# Patient Record
Sex: FEMALE
[De-identification: names, ages, dates, MRNs, and addresses within clinical notes are randomized per-mention and may not be internally consistent; named-entity substitution may affect disease eponyms.]

---

## 2023-01-12 ENCOUNTER — TRANSCRIBE ORDERS (OUTPATIENT)
Dept: OTHER | Age: 78
End: 2023-01-12

## 2023-01-12 ENCOUNTER — MEDICAL CORRESPONDENCE (OUTPATIENT)
Dept: HEALTH INFORMATION MANAGEMENT | Facility: CLINIC | Age: 78
End: 2023-01-12

## 2023-01-12 DIAGNOSIS — C79.51 BONE METASTASES: Primary | ICD-10-CM

## 2023-03-29 ENCOUNTER — TELEPHONE (OUTPATIENT)
Dept: ORTHOPEDICS | Facility: CLINIC | Age: 78
End: 2023-03-29

## 2023-03-29 NOTE — TELEPHONE ENCOUNTER
Called and left voice message asking for a return call to set up an appointment with an orthopedic oncologist. Gave them my direct number to call [phone].    Maria Ines Dunn LPN

## 2023-03-29 NOTE — LETTER
3/29/2023         RE: Lisa MIRANDA Buster  [address] [address] [address]        Dear Ms. Goins-    I left you a voice message today answering a call you made about a referral you were given to see one of our orthopedic oncologists. I wanted to send you a letter as well to be sure you received the information.     Unfortunately our providers are not in the [employer] network. If you still have [employer] for insurance, you will need to call the number on your [employer] card to ask one of their representatives which provider you should follow up with for care.     If you have another insurance provider, please call be back at the number listed in my signature.     Sincerely,    Maria Ines Dunn LPN  She/Her/ Hers  [facility]  [facility]  [address] [address] [phone]

## 2023-03-29 NOTE — TELEPHONE ENCOUNTER
[facility] Call Center    Phone Message    May a detailed message be left on voicemail: yes     Reason for Call Trying to be seen by Doctor for Right Shoulder Please call Mandi . Stated Images in Epic   Action Taken: Message routed to:  [facility] ([facility]): [email or web address]    Travel Screening: Not Applicable

## 2023-03-30 NOTE — TELEPHONE ENCOUNTER
Called and left a  for patient stating that we were no longer in the [employer] network, of patient still had [employer] for insurance she should call the number on the back of her insurance card for a provider to follow up with. If she has insurance other than [employer] she should call me back at [phone]. A letter to this effect was sent as well to address on file.    Maria Ines Dunn LPN